# Patient Record
Sex: FEMALE | Race: WHITE
[De-identification: names, ages, dates, MRNs, and addresses within clinical notes are randomized per-mention and may not be internally consistent; named-entity substitution may affect disease eponyms.]

---

## 2021-04-05 ENCOUNTER — HOSPITAL ENCOUNTER (EMERGENCY)
Dept: HOSPITAL 75 - ER | Age: 66
Discharge: HOME | End: 2021-04-05
Payer: MEDICARE

## 2021-04-05 VITALS — WEIGHT: 227.08 LBS | HEIGHT: 67.72 IN | BODY MASS INDEX: 34.82 KG/M2

## 2021-04-05 VITALS — DIASTOLIC BLOOD PRESSURE: 72 MMHG | SYSTOLIC BLOOD PRESSURE: 155 MMHG

## 2021-04-05 DIAGNOSIS — H92.01: ICD-10-CM

## 2021-04-05 DIAGNOSIS — Z88.0: ICD-10-CM

## 2021-04-05 DIAGNOSIS — R42: Primary | ICD-10-CM

## 2021-04-05 DIAGNOSIS — F17.210: ICD-10-CM

## 2021-04-05 DIAGNOSIS — Z20.822: ICD-10-CM

## 2021-04-05 LAB
ALBUMIN SERPL-MCNC: 3.3 GM/DL (ref 3.2–4.5)
ALP SERPL-CCNC: 140 U/L (ref 40–136)
ALT SERPL-CCNC: 40 U/L (ref 0–55)
BASOPHILS # BLD AUTO: 0.1 10^3/UL (ref 0–0.1)
BASOPHILS NFR BLD AUTO: 1 % (ref 0–10)
BILIRUB SERPL-MCNC: 0.9 MG/DL (ref 0.1–1)
BUN/CREAT SERPL: 9
CALCIUM SERPL-MCNC: 8.3 MG/DL (ref 8.5–10.1)
CHLORIDE SERPL-SCNC: 108 MMOL/L (ref 98–107)
CO2 SERPL-SCNC: 21 MMOL/L (ref 21–32)
CREAT SERPL-MCNC: 0.79 MG/DL (ref 0.6–1.3)
EOSINOPHIL # BLD AUTO: 0.1 10^3/UL (ref 0–0.3)
EOSINOPHIL NFR BLD AUTO: 1 % (ref 0–10)
GFR SERPLBLD BASED ON 1.73 SQ M-ARVRAT: > 60 ML/MIN
GLUCOSE SERPL-MCNC: 126 MG/DL (ref 70–105)
HCT VFR BLD CALC: 44 % (ref 35–52)
HGB BLD-MCNC: 14.1 G/DL (ref 11.5–16)
INR PPP: 1.3 (ref 0.8–1.4)
LYMPHOCYTES # BLD AUTO: 2.4 10^3/UL (ref 1–4)
LYMPHOCYTES NFR BLD AUTO: 24 % (ref 12–44)
MANUAL DIFFERENTIAL PERFORMED BLD QL: NO
MCH RBC QN AUTO: 30 PG (ref 25–34)
MCHC RBC AUTO-ENTMCNC: 32 G/DL (ref 32–36)
MCV RBC AUTO: 91 FL (ref 80–99)
MONOCYTES # BLD AUTO: 0.8 10^3/UL (ref 0–1)
MONOCYTES NFR BLD AUTO: 8 % (ref 0–12)
NEUTROPHILS # BLD AUTO: 6.4 10^3/UL (ref 1.8–7.8)
NEUTROPHILS NFR BLD AUTO: 66 % (ref 42–75)
PLATELET # BLD: 169 10^3/UL (ref 130–400)
PMV BLD AUTO: 12.9 FL (ref 9–12.2)
POTASSIUM SERPL-SCNC: 3.5 MMOL/L (ref 3.6–5)
PROT SERPL-MCNC: 7.2 GM/DL (ref 6.4–8.2)
PROTHROMBIN TIME: 16.1 SEC (ref 12.2–14.7)
SODIUM SERPL-SCNC: 138 MMOL/L (ref 135–145)
WBC # BLD AUTO: 9.7 10^3/UL (ref 4.3–11)

## 2021-04-05 PROCEDURE — 87635 SARS-COV-2 COVID-19 AMP PRB: CPT

## 2021-04-05 PROCEDURE — 71045 X-RAY EXAM CHEST 1 VIEW: CPT

## 2021-04-05 PROCEDURE — 84484 ASSAY OF TROPONIN QUANT: CPT

## 2021-04-05 PROCEDURE — 93005 ELECTROCARDIOGRAM TRACING: CPT

## 2021-04-05 PROCEDURE — 71275 CT ANGIOGRAPHY CHEST: CPT

## 2021-04-05 PROCEDURE — 93041 RHYTHM ECG TRACING: CPT

## 2021-04-05 PROCEDURE — 85379 FIBRIN DEGRADATION QUANT: CPT

## 2021-04-05 PROCEDURE — 86141 C-REACTIVE PROTEIN HS: CPT

## 2021-04-05 PROCEDURE — 70450 CT HEAD/BRAIN W/O DYE: CPT

## 2021-04-05 PROCEDURE — 85025 COMPLETE CBC W/AUTO DIFF WBC: CPT

## 2021-04-05 PROCEDURE — 99284 EMERGENCY DEPT VISIT MOD MDM: CPT

## 2021-04-05 PROCEDURE — 36415 COLL VENOUS BLD VENIPUNCTURE: CPT

## 2021-04-05 PROCEDURE — 80053 COMPREHEN METABOLIC PANEL: CPT

## 2021-04-05 PROCEDURE — 85610 PROTHROMBIN TIME: CPT

## 2021-04-05 NOTE — DIAGNOSTIC IMAGING REPORT
PROCEDURE: 

CT angiography of the chest with contrast.



TECHNIQUE: 

Multiple contiguous axial images were obtained through the chest

after uneventful bolus administration of intravenous contrast. 3D

reconstructed CTA MIP acquisitions were also performed.



Auto Exposure Controls were utilized during the CT exam to meet

ALARA standards for radiation dose reduction. 

 

INDICATION:  

Dizziness. Syncope. Headache. Smoker. 



COMPARISON: 

Chest radiograph 04/05/2021.



FINDINGS: 

No pulmonary artery filling defects. Normal caliber thoracic

aorta. Normal heart size. No pericardial effusion. Prominent

mediastinal lymph nodes remain subcentimeter in short axis

dimension. Moderate emphysema. Mild dependent atelectasis in the

lungs. Calcified granuloma in the right lung base. No pleural

effusion or pneumothorax. No acute osseous findings. Nodular

contour of the liver. Small esophageal hiatal hernia.



IMPRESSION: 

1. No pulmonary emboli.

2. Moderate emphysematous changes in the lungs. Mild dependent

atelectasis.

3. Nodular cirrhotic contour of the liver.



Dictated by: 



  Dictated on workstation # [id number]

## 2021-04-05 NOTE — DIAGNOSTIC IMAGING REPORT
EXAM: CHEST 1 view, AP/PA only.



INDICATION: Dizziness. Syncope. Headache.



COMPARISON: CT chest 05/02/2010.



FINDINGS: Normal heart size and central pulmonary vascularity. No

focal pulmonary opacities. No pleural effusion or pneumothorax.

No acute osseous findings.



IMPRESSION: No acute cardiopulmonary findings.



Dictated by: 



  Dictated on workstation # [id number]

## 2021-04-05 NOTE — ED GENERAL
General


Chief Complaint:  Dizziness/Syncope


Stated Complaint:  HA,DIZZINESS


Nursing Triage Note:  


PT ARRIVED PER EMS FROM [facility], PT HAS DIZZY EPIDSODE AT [facility] AND SENT TO ED FOR 


EVALUATION. PT STATES HAS EAR ACHE AND WAS WAITING ON MEDS. PT HAS IV NS 


INFUSING APPROX 400CC OF FLUID INFUSED. DENIES PAIN AT THIS X. DENIES COUGH, 


FEVER OR SOA


Nursing Sepsis Screen:  No Definite Risk


Source of Information:  Patient


Exam Limitations:  No Limitations





History of Present Illness


Date Seen by Provider:  2021


Time Seen by Provider:  14:04


Initial Comments


Here with report of acute episode of dizziness while at the [facility].  She was seen and evaluated and found to have bulging right TM and 

symptoms consistent with allergic rhinitis.  She was waiting for her 

prescription in her car and went inside to get the prescription but in route 

became quite dizzy and had to lay down.  She was noted to have quite high blood 

pressure at that point.  EMS was summoned and brought her here.  She does have 

known hypertension although that seems to be resolved now.  Denies chest pain, 

breathing problems, nausea, vomiting, weakness, difficulties with speech or 

other concerns.  She states everything was just spinning for a little bit and 

now it seems to be better.


Timing/Duration:  1/2 Hour


Severity:  Mild


Associated Systoms:  No Chest Pain, No Cough, No Fever/Chills, No Naus

ea/Vomiting, No Shortness of Air, No Weakness





Allergies and Home Medications


Allergies


Coded Allergies:  


     Penicillins (Verified  Allergy, Unknown, 21)





Patient Home Medication List


Home Medication List Reviewed:  Yes





Review of Systems


Review of Systems


Constitutional:  see HPI; No chills, No fever; weakness


EENTM:  ear pain, nose congestion; No throat pain


Respiratory:  no symptoms reported


Cardiovascular:  No edema


Gastrointestinal:  No abdominal pain, No nausea, No vomiting


Genitourinary:  no symptoms reported


Musculoskeletal:  no symptoms reported


Skin:  no symptoms reported


Psychiatric/Neurological:  See HPI; Denies Weakness; Other (Dizziness)





All Other Systems Reviewed


Negative Unless Noted:  Yes





Past Medical-Social-Family Hx


Past Med/Social Hx:  Reviewed Nursing Past Med/Soc Hx


Patient Social History


Alcohol Use:  Rarely Uses


Smoking Status:  Current Everyday Smoker


Type Used:  Cigarettes


Recent Infectious Disease Expo:  No


Recent Hopitalizations:  No ( CYST ON PELVIC REMOVED)





Past Medical History


Surgeries:  Yes (ABOVE AND CATARACTS)


Respiratory:  No


Cardiac:  No


Neurological:  No


Reproductive Disorders:  No


Gastrointestinal:  No


Musculoskeletal:  No


Endocrine:  No


Psychosocial:  No


Blood Disorders:  No





Family Medical History


Reviewed Nursing Family Hx





Physical Exam


Vital Signs





Vital Signs - First Documented








 21





 13:45


 


Temp 36.9


 


Pulse 89


 


Resp 13


 


B/P (MAP) 145/81 (102)


 


Pulse Ox 94


 


O2 Delivery Room Air





Capillary Refill : Less Than 3 Seconds


Height, Weight, BMI


Height: '"


Weight: lbs. oz. kg; 34.00 BMI


Method:


General Appearance:  No Apparent Distress, WD/WN


HEENT:  PERRL/EOMI, Pharynx Normal; No TM Abnormal (L); TM Abnormal (R) (Bulging

membrane)


Neck:  Non Tender, Supple


Respiratory:  Lungs Clear, Normal Breath Sounds


Cardiovascular:  Regular Rate, Rhythm, No Murmur


Gastrointestinal:  Non Tender, Soft


Extremity:  Normal Range of Motion, Non Tender, No Calf Tenderness, No Pedal 

Edema


Neurologic/Psychiatric:  Alert, Oriented x3, No Motor/Sensory Deficits, Normal 

Mood/Affect, CNs II-XII Norm as Tested


Skin:  Normal Color, Warm/Dry





Progress/Results/Core Measures


Suspected Sepsis


Recent Fever Within 48 Hours:  No


Infection Criteria Present:  None


New/Unexplained  Altered Menta:  No


Sepsis Screen:  No Definite Risk


SIRS


Temperature: 


Pulse: 89 


Respiratory Rate: 13


 


Laboratory Tests


21 13:50: White Blood Count 9.7


Blood Pressure 145 /81 


Mean: 102


 


Laboratory Tests


21 13:50: 


Creatinine 0.79, INR Comment 1.3, Platelet Count 169, Total Bilirubin 0.9








Results/Orders


Lab Results





Laboratory Tests








Test


 21


13:50 21


13:52 Range/Units


 


 


White Blood Count


 9.7 


 


 4.3-11.0


10^3/uL


 


Red Blood Count


 4.78 


 


 3.80-5.11


10^6/uL


 


Hemoglobin 14.1   11.5-16.0  g/dL


 


Hematocrit 44   35-52  %


 


Mean Corpuscular Volume 91   80-99  fL


 


Mean Corpuscular Hemoglobin 30   25-34  pg


 


Mean Corpuscular Hemoglobin


Concent 32 


 


 32-36  g/dL





 


Red Cell Distribution Width 13.2   10.0-14.5  %


 


Platelet Count


 169 


 


 130-400


10^3/uL


 


Mean Platelet Volume 12.9 H  9.0-12.2  fL


 


Immature Granulocyte % (Auto) 0    %


 


Neutrophils (%) (Auto) 66   42-75  %


 


Lymphocytes (%) (Auto) 24   12-44  %


 


Monocytes (%) (Auto) 8   0-12  %


 


Eosinophils (%) (Auto) 1   0-10  %


 


Basophils (%) (Auto) 1   0-10  %


 


Neutrophils # (Auto)


 6.4 


 


 1.8-7.8


10^3/uL


 


Lymphocytes # (Auto)


 2.4 


 


 1.0-4.0


10^3/uL


 


Monocytes # (Auto)


 0.8 


 


 0.0-1.0


10^3/uL


 


Eosinophils # (Auto)


 0.1 


 


 0.0-0.3


10^3/uL


 


Basophils # (Auto)


 0.1 


 


 0.0-0.1


10^3/uL


 


Immature Granulocyte # (Auto)


 0.0 


 


 0.0-0.1


10^3/uL


 


Prothrombin Time 16.1 H  12.2-14.7  SEC


 


INR Comment 1.3   0.8-1.4  


 


D-Dimer


 1.70 H


 


 0.00-0.49


UG/ML


 


Sodium Level 138   135-145  MMOL/L


 


Potassium Level 3.5 L  3.6-5.0  MMOL/L


 


Chloride Level 108 H    MMOL/L


 


Carbon Dioxide Level 21   21-32  MMOL/L


 


Anion Gap 9   5-14  MMOL/L


 


Blood Urea Nitrogen 7   7-18  MG/DL


 


Creatinine


 0.79 


 


 0.60-1.30


MG/DL


 


Estimat Glomerular Filtration


Rate > 60 


 


  





 


BUN/Creatinine Ratio 9    


 


Glucose Level 126 H    MG/DL


 


Calcium Level 8.3 L  8.5-10.1  MG/DL


 


Corrected Calcium 8.9   8.5-10.1  MG/DL


 


Total Bilirubin 0.9   0.1-1.0  MG/DL


 


Aspartate Amino Transf


(AST/SGOT) 52 H


 


 5-34  U/L





 


Alanine Aminotransferase


(ALT/SGPT) 40 


 


 0-55  U/L





 


Alkaline Phosphatase 140 H    U/L


 


Troponin I < 0.028   <0.028  NG/ML


 


C-Reactive Protein High


Sensitivity 0.82 H


 


 0.00-0.50


MG/DL


 


Total Protein 7.2   6.4-8.2  GM/DL


 


Albumin 3.3   3.2-4.5  GM/DL


 


Coronavirus 2019 (JOHAN)  Negative  Negative  








My Orders





Orders - DIANA GALLEGOS MD


Hs C Reactive Protein (21 14:03)


Covid 19 Inhouse Test (21 14:03)


Fibrin Degradation Products (21 14:10)


Ct Angio Chest W (21 15:28)


Iohexol Injection (Omnipaque 350 Mg/Ml 1 (21 15:30)


Received Contrast (Hold Metformin- Contr (21 15:30)


Ns (Ivpb) (Sodium Chloride 0.9% Ivpb Bag (21 15:30)





Medications Given in ED





Current Medications








 Medications  Dose


 Ordered  Sig/Yemi


 Route  Start Time


 Stop Time Status Last Admin


Dose Admin


 


 Iohexol  100 ml  ONCE  ONCE


 IV  21 15:30


 21 15:31 DC 21 15:45


83 ML


 


 Sodium Chloride  100 ml  ONCE  ONCE


 IV  21 15:30


 21 15:31 DC 21 15:45


80 ML








Vital Signs/I&O











 21





 13:45


 


Temp 36.9


 


Pulse 89


 


Resp 13


 


B/P (MAP) 145/81 (102)


 


Pulse Ox 94


 


O2 Delivery Room Air





Capillary Refill : Less Than 3 Seconds








Blood Pressure Mean:                    102








Progress Note :  


Progress Note


Seen and evaluated.  IV by EMS with normal saline 1 L bolus running.  We will 

continue that.  Blood pressures 120s to 140s systolic and patient has no 

significant symptoms currently.  Denies injury with her dizziness episode.  Has 

had some labile hypertension.  Denies Covid contacts and has not had vaccin

ation.  We will go ahead and check Covid as well as basic labs, EKG and CT of 

her head.  Monitor patient.  1530: D-dimer is elevated at 1.7.  CT head 

negative.  Patient still doing well.  O2 sats 91% while resting with normal 

heart rate.  Denies chest pain.  We will go ahead and get CT of the chest to 

rule out pulmonary embolus due to elevated D-dimer and marginal saturations.  

Monitor patient.  1615: No acute findings on CT.  Patient resting comfortably.  

O2 saturations 91 to 94% while resting.  Blood pressure 130s to 150s systolic 

and currently 138/60.  Denies significant dizziness currently.  Discharged home 

with return precautions.  Patient verbalized understanding of instructions and 

agreement with plan.





ECG


Initial ECG Impression Date:  2021


Initial ECG Impression Time:  13:59


Initial ECG Rate:  84


Initial ECG Rhythm:  Normal Sinus


Initial ECG Impression:  Normal


Comment


Sinus rhythm with normal axis.  No evidence of ST elevation MI.  Similar to 

previous of 2010.  Interpreted by me.





Diagnostic Imaging





   Diagonstic Imaging:  CT


   Plain Films/CT/US/NM/MRI:  head


Comments


                 [facility] [place], [place]





NAME:   YOHANA DODGE


MED REC#:   [id number]


ACCOUNT#:   [id number]


PT STATUS:   REG ER


:   1955


PHYSICIAN:   NAHEED ALBARRAN


ADMIT DATE:   21/ER


                                   ***Draft***


Date of Exam:21





CT HEAD WO








PROCEDURE: CT head without contrast.





TECHNIQUE: Multiple contiguous axial images were obtained through


the brain without the use of intravenous contrast. Auto Exposure


Controls were utilized during the CT exam to meet ALARA standards


for radiation dose reduction. 





INDICATION: Headache. Dizziness. Earache. 





COMPARISON: None.





FINDINGS: No intracranial hemorrhage, mass effect, hydrocephalus,


or extra-axial fluid collections. No CT evidence of a territorial


infarction. Osseous structures are intact. Paranasal sinuses and


mastoids are clear.





IMPRESSION: No acute intracranial CT findings.





  Dictated on workstation # [id number]








Dict:   21 1507


Trans:   21 1513


[id number]





Interpreted by:     LESLY JOY MD


Electronically signed by:








   Diagonstic Imaging:  Xray








   Diagonstic Imaging:  CT


   Plain Films/CT/US/NM/MRI:  chest


Comments


NAME:   YOHANA DODGE


MED REC#:   [id number]


ACCOUNT#:   [id number]


PT STATUS:   REG ER


:   1955


PHYSICIAN:   DINAA GALLEGOS MD


ADMIT DATE:   21/ER


                                   ***Draft***


Date of Exam:21





CT ANGIO CHEST W








PROCEDURE: 


CT angiography of the chest with contrast.





TECHNIQUE: 


Multiple contiguous axial images were obtained through the chest


after uneventful bolus administration of intravenous contrast. 3D


reconstructed CTA MIP acquisitions were also performed.





Auto Exposure Controls were utilized during the CT exam to meet


ALARA standards for radiation dose reduction. 


 


INDICATION:  


Dizziness. Syncope. Headache. Smoker. 





COMPARISON: 


Chest radiograph 2021.





FINDINGS: 


No pulmonary artery filling defects. Normal caliber thoracic


aorta. Normal heart size. No pericardial effusion. Prominent


mediastinal lymph nodes remain subcentimeter in short axis


dimension. Moderate emphysema. Mild dependent atelectasis in the


lungs. Calcified granuloma in the right lung base. No pleural


effusion or pneumothorax. No acute osseous findings. Nodular


contour of the liver. Small esophageal hiatal hernia.





IMPRESSION: 


1. No pulmonary emboli.


2. Moderate emphysematous changes in the lungs. Mild dependent


atelectasis.


3. Nodular cirrhotic contour of the liver.





  Dictated on workstation # [id number]








Dict:   21 1554


Trans:   21 1603


 [id number]





Interpreted by:     LESLY JOY MD


Electronically signed by:


   Reviewed:  Reviewed by Me





Departure


Impression





   Primary Impression:  


   Dizziness


   Additional Impression:  


   Right ear pain


Disposition:  01 HOME, SELF-CARE


Condition:  Improved





Departure-Patient Inst.


Decision time for Depature:  16:17


Referrals:  


NO,LOCAL PHYSICIAN (PCP/Family)


Primary Care Physician


Patient Instructions:  Vertigo (a Type of Dizziness) (DC)





Add. Discharge Instructions:  








All discharge instructions reviewed with patient and/or family. Voiced 

understanding.





Your bulging eardrum may be related to allergies and congestion.  Take 

medications as prescribed from the clinic.  You may use Afrin nasal spray or the

generic, 12-hour relief, 2 sprays to each nostril twice daily for 3 days only 

and then stop.  Do not take more than 3 days.  Continue home medications as 

previously prescribed including your blood pressure medicine.  Follow-up with 

your doctor in a few days for recheck.  Return for worse pain, fever, vomiting, 

weakness, breathing problems, vision or balance problems, difficulties with 

speech or facial droop or other concerns as needed.  You should consider 

quitting smoking as there is chronic damage to your lungs noted on CT scan.











DIANA GALLEGOS MD           2021 14:46

## 2021-04-05 NOTE — DIAGNOSTIC IMAGING REPORT
PROCEDURE: CT head without contrast.



TECHNIQUE: Multiple contiguous axial images were obtained through

the brain without the use of intravenous contrast. Auto Exposure

Controls were utilized during the CT exam to meet ALARA standards

for radiation dose reduction. 



INDICATION: Headache. Dizziness. Earache. 



COMPARISON: None.



FINDINGS: No intracranial hemorrhage, mass effect, hydrocephalus,

or extra-axial fluid collections. No CT evidence of a territorial

infarction. Osseous structures are intact. Paranasal sinuses and

mastoids are clear.



IMPRESSION: No acute intracranial CT findings.



Dictated by: 



  Dictated on workstation # [id number]

## 2022-06-08 ENCOUNTER — HOSPITAL ENCOUNTER (OUTPATIENT)
Dept: HOSPITAL 75 - PREOP | Age: 67
Discharge: HOME | End: 2022-06-08
Attending: SURGERY
Payer: MEDICARE

## 2022-06-08 DIAGNOSIS — Z01.818: Primary | ICD-10-CM
